# Patient Record
Sex: MALE | Race: AMERICAN INDIAN OR ALASKA NATIVE | HISPANIC OR LATINO
[De-identification: names, ages, dates, MRNs, and addresses within clinical notes are randomized per-mention and may not be internally consistent; named-entity substitution may affect disease eponyms.]

---

## 2020-10-21 ENCOUNTER — APPOINTMENT (OUTPATIENT)
Dept: NEUROLOGY | Facility: CLINIC | Age: 25
End: 2020-10-21
Payer: MEDICAID

## 2020-10-21 VITALS
DIASTOLIC BLOOD PRESSURE: 67 MMHG | WEIGHT: 123 LBS | OXYGEN SATURATION: 98 % | BODY MASS INDEX: 19.3 KG/M2 | SYSTOLIC BLOOD PRESSURE: 102 MMHG | TEMPERATURE: 98.7 F | HEIGHT: 67 IN | HEART RATE: 66 BPM

## 2020-10-21 DIAGNOSIS — Z86.59 PERSONAL HISTORY OF OTHER MENTAL AND BEHAVIORAL DISORDERS: ICD-10-CM

## 2020-10-21 PROBLEM — Z00.00 ENCOUNTER FOR PREVENTIVE HEALTH EXAMINATION: Status: ACTIVE | Noted: 2020-10-21

## 2020-10-21 PROCEDURE — 99072 ADDL SUPL MATRL&STAF TM PHE: CPT

## 2020-10-21 PROCEDURE — 99214 OFFICE O/P EST MOD 30 MIN: CPT

## 2020-10-21 NOTE — DISCUSSION/SUMMARY
[FreeTextEntry1] : Impression: \par 24 year old male presents today for evaluation of feeling slow and time management issues. He states this all happened about seven years ago.\par \par History of schizophrenia.\par \par MOCA 17/30\par \par Plan: \par 1) Baseline labs, heavy metal screen, and autoimmune panel\par 2) Needs neuro psych testing \par 3) Needs rEEG \par 4) Parents to get MRI scan and medical records from [facility]\par 5) Follow up after labs, rEEG, and neuro psych testing \par

## 2020-10-21 NOTE — HISTORY OF PRESENT ILLNESS
[FreeTextEntry1] : Viktor is a 24 year, , old male who presents today for evaluation of cognitive function. Patient accompanied by his parents. \par \par Patient is English speaking. Parents speak mainly Azeri,  is used.\par \par Viktor reports that this all started 6 years ago and he just feels different. He has a hard time managing his tasks and cannot complete more than one thing at a time.\par \par The patient's mom says he is here today because up to 7 years ago he was a normal child. 6 years ago he had jaw surgery and soon after he became different and "slow". He slowly worsened over the next few years. He cannot manage his time. He finished high school and went to community college for 2 semesters. He was not doing his homework and had trouble concentrating. His classes would start at 9 AM and he would not leave to get to school until 10 am and then would leave late. He takes extremely long times to eat his meals and to take a bath. \par \par He started seeing a psychiatrist a year ago and was diagnosed with schizophrenia, the type was unknown. He was placed on Latuda and slowly increased to 20 mg and she noticed he became more irritable and there was no changes. They stopped the Latuda in May 2020. The irritableness improved off the medication.\par \par They had an MRI about 1.5 years ago and it showed nothing reports the parents. No other work up was done. The MRI was done in [facility].

## 2020-10-21 NOTE — PHYSICAL EXAM
[FreeTextEntry1] : General:\par Constitutional:  Sitting comfortably in NAD.\par Psychiatric: well-groomed, appropriate affect, insight/judgment intact\par Ears, Nose, Throat: no abnormalities, mucus membranes moist\par Neck: supple\par Cardiovascular: regular rate and rhythm, normal S1/S2, no murmurs \par Chest: Clear to bases. 	Abdomen: soft, non-tender\par Extremities: no edema, clubbing or cyanosis\par Skin:  no rash or neurocutaneous signs \par \par Cognitive: MOCA 17/30\par UNABLE TO FOLLOW MULTIPLE COMMANDS\par Orientation, language, memory and knowledge screens intact.\par Cranial Nerves:\par II: Full to confrontation; disc margins sharp. III/IV/VI: PERRL EOMF No nystagmus\par V1V2V3: Symmetric, VII: Face appears symmetric VIII: Normal to screening, IX/X: Palate Elevates Symmetrical  XI: Trapezius Symmetric  XII: Tongue midline\par Motor:\par Power: 5/5 throughout, tone: normal x 4 limbs, no tremor \par Sensation:\par Intact to light touch. \par Coordination/Gait:\par Finger-nose-finger intact, normal rapid-alternating movements.\par Fine motor normal with normal rapid finger taps\par Narrow based gait, tandem forward and back ok\par Reflexes:\par DTR: 2+ symmetric x 4 limbs\par Plantar response: down x 2

## 2020-10-22 LAB
ALBUMIN SERPL ELPH-MCNC: 4.9 G/DL
ALP BLD-CCNC: 108 U/L
ALT SERPL-CCNC: 18 U/L
ANION GAP SERPL CALC-SCNC: 11 MMOL/L
AST SERPL-CCNC: 17 U/L
BASOPHILS # BLD AUTO: 0.06 K/UL
BASOPHILS NFR BLD AUTO: 1 %
BILIRUB SERPL-MCNC: 0.6 MG/DL
BUN SERPL-MCNC: 15 MG/DL
CALCIUM SERPL-MCNC: 10.1 MG/DL
CHLORIDE SERPL-SCNC: 102 MMOL/L
CK SERPL-CCNC: 72 U/L
CO2 SERPL-SCNC: 28 MMOL/L
CREAT SERPL-MCNC: 0.68 MG/DL
CRP SERPL-MCNC: 0.12 MG/DL
EOSINOPHIL # BLD AUTO: 0.12 K/UL
EOSINOPHIL NFR BLD AUTO: 2 %
ERYTHROCYTE [SEDIMENTATION RATE] IN BLOOD BY WESTERGREN METHOD: 10 MM/HR
GLUCOSE SERPL-MCNC: 98 MG/DL
HCT VFR BLD CALC: 43.7 %
HGB BLD-MCNC: 14.3 G/DL
IMM GRANULOCYTES NFR BLD AUTO: 0.2 %
LYMPHOCYTES # BLD AUTO: 1.61 K/UL
LYMPHOCYTES NFR BLD AUTO: 27.3 %
MAN DIFF?: NORMAL
MCHC RBC-ENTMCNC: 30.9 PG
MCHC RBC-ENTMCNC: 32.7 GM/DL
MCV RBC AUTO: 94.4 FL
MONOCYTES # BLD AUTO: 0.44 K/UL
MONOCYTES NFR BLD AUTO: 7.5 %
NEUTROPHILS # BLD AUTO: 3.65 K/UL
NEUTROPHILS NFR BLD AUTO: 62 %
PLATELET # BLD AUTO: 184 K/UL
POTASSIUM SERPL-SCNC: 4 MMOL/L
PROT SERPL-MCNC: 7.4 G/DL
RBC # BLD: 4.63 M/UL
RBC # FLD: 13 %
RHEUMATOID FACT SER QL: <10 IU/ML
SODIUM SERPL-SCNC: 142 MMOL/L
TSH SERPL-ACNC: 2.38 UIU/ML
WBC # FLD AUTO: 5.89 K/UL

## 2020-10-23 LAB
ACE BLD-CCNC: 33 U/L
ALBUMIN MFR SERPL ELPH: 58.8 %
ALBUMIN SERPL-MCNC: 4.4 G/DL
ALBUMIN/GLOB SERPL: 1.5 RATIO
ALPHA1 GLOB MFR SERPL ELPH: 3.6 %
ALPHA1 GLOB SERPL ELPH-MCNC: 0.3 G/DL
ALPHA2 GLOB MFR SERPL ELPH: 7.5 %
ALPHA2 GLOB SERPL ELPH-MCNC: 0.6 G/DL
B-GLOBULIN MFR SERPL ELPH: 10.6 %
B-GLOBULIN SERPL ELPH-MCNC: 0.8 G/DL
C3 SERPL-MCNC: 88 MG/DL
C4 SERPL-MCNC: 12 MG/DL
ENA SCL70 IGG SER IA-ACNC: <0.2 AL
ENA SS-A AB SER IA-ACNC: <0.2 AL
ENA SS-B AB SER IA-ACNC: <0.2 AL
GAMMA GLOB FLD ELPH-MCNC: 1.4 G/DL
GAMMA GLOB MFR SERPL ELPH: 19.5 %
INTERPRETATION SERPL IEP-IMP: NORMAL
M PROTEIN SPEC IFE-MCNC: NORMAL
PROT SERPL-MCNC: 7.4 G/DL
PROT SERPL-MCNC: 7.4 G/DL
THYROGLOB AB SERPL-ACNC: <20 IU/ML
THYROPEROXIDASE AB SERPL IA-ACNC: 21.4 IU/ML

## 2020-12-04 ENCOUNTER — APPOINTMENT (OUTPATIENT)
Dept: NEUROLOGY | Facility: CLINIC | Age: 25
End: 2020-12-04
Payer: MEDICAID

## 2020-12-04 PROCEDURE — 95816 EEG AWAKE AND DROWSY: CPT

## 2020-12-04 PROCEDURE — 99072 ADDL SUPL MATRL&STAF TM PHE: CPT

## 2020-12-08 LAB
ANA PAT FLD IF-IMP: ABNORMAL
ANA SER IF-ACNC: ABNORMAL

## 2021-02-11 ENCOUNTER — APPOINTMENT (OUTPATIENT)
Dept: NEUROLOGY | Facility: CLINIC | Age: 26
End: 2021-02-11
Payer: MEDICAID

## 2021-02-11 VITALS
DIASTOLIC BLOOD PRESSURE: 73 MMHG | OXYGEN SATURATION: 96 % | HEIGHT: 67 IN | BODY MASS INDEX: 19.62 KG/M2 | SYSTOLIC BLOOD PRESSURE: 115 MMHG | WEIGHT: 125 LBS | TEMPERATURE: 98.6 F | HEART RATE: 84 BPM

## 2021-02-11 PROCEDURE — 99214 OFFICE O/P EST MOD 30 MIN: CPT

## 2021-02-11 PROCEDURE — 99072 ADDL SUPL MATRL&STAF TM PHE: CPT

## 2021-02-11 NOTE — HISTORY OF PRESENT ILLNESS
[FreeTextEntry1] : \amber Lr returns for follow-up today, with his father.\par \par There are concerns about his cognition.  Hx of schizophrenia.\par \par He reports doing a bit better than when first seen in October.\par \par His father reports that he is asking for permission for everything, such as cutting his own fingernails.\par Father reports not a lot of motivation, and his mother is becoming upset about it.\par He was better before the pandemic.\amber Marquez reports no concerns about covid specifically.\par Still may take 1 hour to eat, used to be 2 hours.  Showers take 30min.\par javier Has had a tantrum last week, kicking and punching the walls, after his mother mentioned he was \par Felt there was too much noise and people were talking about him.\par javier Does not recall taking benzodiazepines.\par \par

## 2021-02-11 NOTE — DISCUSSION/SUMMARY
[FreeTextEntry1] : Impression:\par 1) prior diagnosis of schizophrenia, but not seeing therapist or psychiatrist.  Some question \par \par Plan:\par 1) trial of clonazepam 0.5mg PRN agitation/outbrust\par \par

## 2021-02-11 NOTE — PHYSICAL EXAM
[FreeTextEntry1] : General:\par Constitutional:  Sitting comfortably in NAD.\par Psychiatric: well-groomed, appropriate affect - mild psychomotor slowing.\par Ears, Nose, Throat: no abnormalities, mucus membranes moist\par Neck: supple\par Extremities: no edema, clubbing or cyanosis\par Skin: no rash or neuro-cutaneous signs \par \par Cognitive:\par Orientation, language, memory and knowledge screens intact.\par \par Cranial Nerves:\par II: CAMERON. III/IV/VI: EOM Full.  VII: Face appears symmetric VIII: Normal to screening\par IX/X: normal phonation  XI: Trapezius Symmetric  XII: Tongue midline\par Motor:\par Power: no pronator drift\par \par Narrow based gait\par \par \par \par

## 2021-04-20 ENCOUNTER — APPOINTMENT (OUTPATIENT)
Dept: NEUROLOGY | Facility: CLINIC | Age: 26
End: 2021-04-20
Payer: MEDICAID

## 2021-04-20 PROCEDURE — 99072 ADDL SUPL MATRL&STAF TM PHE: CPT

## 2021-04-20 PROCEDURE — 96138 PSYCL/NRPSYC TECH 1ST: CPT

## 2021-04-20 PROCEDURE — 96133 NRPSYC TST EVAL PHYS/QHP EA: CPT

## 2021-04-20 PROCEDURE — 96139 PSYCL/NRPSYC TST TECH EA: CPT

## 2021-04-20 PROCEDURE — 96116 NUBHVL XM PHYS/QHP 1ST HR: CPT

## 2021-04-20 PROCEDURE — 96132 NRPSYC TST EVAL PHYS/QHP 1ST: CPT

## 2021-04-28 ENCOUNTER — APPOINTMENT (OUTPATIENT)
Dept: NEUROLOGY | Facility: CLINIC | Age: 26
End: 2021-04-28
Payer: MEDICAID

## 2021-04-28 PROCEDURE — 96133 NRPSYC TST EVAL PHYS/QHP EA: CPT | Mod: 95

## 2021-07-09 ENCOUNTER — LABORATORY RESULT (OUTPATIENT)
Age: 26
End: 2021-07-09

## 2021-07-09 ENCOUNTER — APPOINTMENT (OUTPATIENT)
Dept: NEUROLOGY | Facility: CLINIC | Age: 26
End: 2021-07-09
Payer: MEDICAID

## 2021-07-09 VITALS
OXYGEN SATURATION: 95 % | BODY MASS INDEX: 18.68 KG/M2 | TEMPERATURE: 98.6 F | HEIGHT: 67 IN | SYSTOLIC BLOOD PRESSURE: 115 MMHG | RESPIRATION RATE: 16 BRPM | DIASTOLIC BLOOD PRESSURE: 63 MMHG | HEART RATE: 86 BPM | WEIGHT: 119 LBS

## 2021-07-09 DIAGNOSIS — R45.1 RESTLESSNESS AND AGITATION: ICD-10-CM

## 2021-07-09 PROCEDURE — 99214 OFFICE O/P EST MOD 30 MIN: CPT

## 2021-07-09 NOTE — PHYSICAL EXAM
[FreeTextEntry1] : General:\par Constitutional:  Sitting comfortably in NAD.\par Psychiatric: well-groomed, appropriate affect\par Ears, Nose, Throat: no abnormalities, mucus membranes moist\par Neck: supple\par Extremities: no edema, clubbing or cyanosis\par Skin: no rash or neuro-cutaneous signs \par \par Denies voices\par \par Cognitive:\par Slowed but responsive and engaging.  Difficulty completing sentences.\par President: Cal Muñoz,  is Desi Matute\par Prior: Mal Mark\par Date: took 1 minute, but then was able to get the date correctly.\par \par Cranial Nerves:\par II: CAMERON. III/IV/VI: EOM Full.  VII: Face appears symmetric VIII: Normal to screening\par IX/X: normal phonation  XI: Trapezius Symmetric  XII: Tongue midline\par Motor:\par Power: no pronator drift\par \par Narrow based gait\par \par \par \par

## 2021-07-09 NOTE — DISCUSSION/SUMMARY
[FreeTextEntry1] : Impression:\par 1) prior diagnosis of schizophrenia, but not seeing therapist or psychiatrist any longer.  Some question of whether this secondary to a neurological primary cause, such as an autoimmune process.\par \par Plan:\par 1) re-send trial of clonazepam 0.5mg PRN agitation/outbrust\par 2) INR/PTT and brief immunology screen\par 3) PET scan\par 4) vEEG with LP

## 2021-07-09 NOTE — HISTORY OF PRESENT ILLNESS
[FreeTextEntry1] : \amber Lr returns for follow-up today, with his father.\par \par There have been no changes in his behavior since last visit.\par \par Neuropscyhology testing showed profound dysfunction, and although they cannot determine whether this is primary and a secondary cause to psychosis, from the psychology perspective his pattern and temporal course is consistent with schizophrenia\par \par His father reports he is still very slow, cannot complete tasks, that he is asking for permission for everything, such as cutting his own fingernails.\par Father reports not a lot of motivation, and his mother is becoming upset about it.\par \amber Father reports that he does not like other people, does not like noises, and sounds to have delusions about other people talking negatively about him, occasionally positive things.\par \par He was better before the pandemic.\amber Marquez reports no concerns about covid specifically.\amebr Still may take 1 hour to eat, used to be 2 hours.  Showers take 30min.\amber herrera Has had a tantrum last week, kicking and punching the walls, after his mother mentioned he was \amber Felt there was too much noise and people were talking about him.\par javier Does not recall taking benzodiazepines.\par \par

## 2021-07-13 LAB
A-TUMOR NECROSIS FACT SERPL-MCNC: 1.7 PG/ML
B BURGDOR IGG+IGM SER QL IB: NORMAL
C3 SERPL-MCNC: 97 MG/DL
C4 SERPL-MCNC: 13 MG/DL
CH50 SERPL-MCNC: 42 U/ML
CK SERPL-CCNC: 83 U/L
CRP SERPL-MCNC: <3 MG/L
ERYTHROCYTE [SEDIMENTATION RATE] IN BLOOD BY WESTERGREN METHOD: 14 MM/HR
ESTIMATED AVERAGE GLUCOSE: 97 MG/DL
FOLATE SERPL-MCNC: 10.2 NG/ML
HBA1C MFR BLD HPLC: 5 %
IGNF SERPL-MCNC: <4.2 PG/ML
IL10 SERPL-MCNC: <2.8 PG/ML
IL12 SERPL-MCNC: <1.9 PG/ML
IL13 SERPL-MCNC: <1.7 PG/ML
IL17A SERPL-MCNC: <1.4 PG/ML
IL2 SERPL-MCNC: 325.9 PG/ML
IL2 SERPL-MCNC: <2.1 PG/ML
IL4 SERPL-MCNC: <2.2 PG/ML
IL6 SERPL-MCNC: <2 PG/ML
IL8 SERPL-MCNC: <3 PG/ML
INR PPP: 1.02 RATIO
INTERLEUKIN 1 BETA: <6.5 PG/ML
INTERLEUKIN 5: <2.1 PG/ML
PT BLD: 12 SEC
THYROGLOB AB SERPL-ACNC: <20 IU/ML
THYROPEROXIDASE AB SERPL IA-ACNC: 25.6 IU/ML
TSH SERPL-ACNC: 2.29 UIU/ML
VIT B12 SERPL-MCNC: 592 PG/ML

## 2021-07-16 LAB
GAD65 AB SER-MCNC: 0 NMOL/L
PARANEOPLASTIC AB PNL SER: NORMAL

## 2021-07-17 ENCOUNTER — APPOINTMENT (OUTPATIENT)
Dept: MRI IMAGING | Facility: CLINIC | Age: 26
End: 2021-07-17
Payer: MEDICAID

## 2021-07-17 ENCOUNTER — RESULT REVIEW (OUTPATIENT)
Age: 26
End: 2021-07-17

## 2021-07-17 ENCOUNTER — OUTPATIENT (OUTPATIENT)
Dept: OUTPATIENT SERVICES | Facility: HOSPITAL | Age: 26
LOS: 1 days | End: 2021-07-17

## 2021-07-17 PROCEDURE — 70551 MRI BRAIN STEM W/O DYE: CPT | Mod: 26

## 2021-08-05 ENCOUNTER — OUTPATIENT (OUTPATIENT)
Dept: OUTPATIENT SERVICES | Facility: HOSPITAL | Age: 26
LOS: 1 days | End: 2021-08-05
Payer: MEDICAID

## 2021-08-05 LAB — GLUCOSE BLDC GLUCOMTR-MCNC: 94 MG/DL — SIGNIFICANT CHANGE UP (ref 70–99)

## 2021-08-05 PROCEDURE — 82962 GLUCOSE BLOOD TEST: CPT

## 2021-08-05 PROCEDURE — 78608 BRAIN IMAGING (PET): CPT

## 2021-08-05 PROCEDURE — 78608 BRAIN IMAGING (PET): CPT | Mod: 26

## 2021-08-26 ENCOUNTER — APPOINTMENT (OUTPATIENT)
Dept: NEUROLOGY | Facility: CLINIC | Age: 26
End: 2021-08-26
Payer: MEDICAID

## 2021-08-26 VITALS
BODY MASS INDEX: 18.64 KG/M2 | HEIGHT: 66 IN | DIASTOLIC BLOOD PRESSURE: 65 MMHG | RESPIRATION RATE: 16 BRPM | WEIGHT: 116 LBS | SYSTOLIC BLOOD PRESSURE: 102 MMHG | HEART RATE: 73 BPM | TEMPERATURE: 98.7 F | OXYGEN SATURATION: 97 %

## 2021-08-26 DIAGNOSIS — R68.89 OTHER GENERAL SYMPTOMS AND SIGNS: ICD-10-CM

## 2021-08-26 PROCEDURE — 99213 OFFICE O/P EST LOW 20 MIN: CPT

## 2021-09-19 ENCOUNTER — RX RENEWAL (OUTPATIENT)
Age: 26
End: 2021-09-19

## 2021-10-06 PROBLEM — R68.89 SPELLS OF DECREASED ATTENTIVENESS: Status: ACTIVE | Noted: 2021-07-09

## 2021-10-06 NOTE — HISTORY OF PRESENT ILLNESS
[FreeTextEntry1] : \amber Lr returns for follow-up today, with his father.\par \par There have been no big changes in his behavior since last visit.\par \par FDG performed - seen to be normal\par \par He is still having difficulty with feeling that neighbors and strangers are talking about him.\par The family believes \par \par \par Neuropsychology testing showed profound dysfunction, and although they cannot determine whether this is primary and a secondary cause to psychosis, from the psychology perspective his pattern and temporal course is consistent with schizophrenia\par \par His father reports he is still very slow, cannot complete tasks, that he is asking for permission for everything, such as cutting his own fingernails.\par \par Father reports not a lot of motivation, and his mother is becoming upset about it.\par \par He was better before the pandemic.\amber Marquez reports no concerns about covid specifically.\par Still may take 1 hour to eat, used to be 2 hours.  Showers take 30min.\par javier Has had a tantrum last week, kicking and punching the walls, after his mother mentioned he was \par Felt there was too much noise and people were talking about him.\par \amber Does not recall taking benzodiazepines.\par \par

## 2021-10-06 NOTE — DISCUSSION/SUMMARY
[FreeTextEntry1] : Impression:\par 1) significant decline, cognitive\par 2) used anti-psychotic, Latuda, for 2-3 months without improvement and perhaps worse.\par \par \par Plan:\par 1) will offer to the family a final step in the evaluation, admit for vEEG, LP, and trial of steroids and psychiatry consultation.  They will consider and let us know.\par 2) trial of effexor

## 2022-03-21 NOTE — PHYSICAL EXAM
[FreeTextEntry1] : General:\par Constitutional:  Sitting comfortably in NAD.\par Psychiatric: well-groomed, appropriate affect, eye contact\par Ears, Nose, Throat: no abnormalities, mucus membranes moist\par Neck: supple\par Extremities: no edema, clubbing or cyanosis\par Skin: no rash or neuro-cutaneous signs \par \par Denies voices\par \par Cognitive:\par Slowed but responsive and engaging.  Difficulty completing sentences.\par President: Cal Muñoz,  is Desi Matute\par \par Cranial Nerves:\par II: CAMERON. III/IV/VI: EOM Full.  VII: Face appears symmetric VIII: Normal to screening\par IX/X: normal phonation  XI: Trapezius Symmetric  XII: Tongue midline\par Motor:\par Power: no pronator drift\par \par Narrow based gait\par \par \par \par 
21-Mar-2022 08:47

## 2022-07-26 LAB
A-TUMOR NECROSIS FACT SERPL-MCNC: <1.7 PG/ML
ARSENIC, BLOOD: 7 UG/L
CADMIUM SERPL-MCNC: <0.5 UG/L
GAD65 AB SER-MCNC: 0 NMOL/L
IGNF SERPL-MCNC: <4.2 PG/ML
IL10 SERPL-MCNC: <2.8 PG/ML
IL12 SERPL-MCNC: <1.9 PG/ML
IL13 SERPL-MCNC: <1.7 PG/ML
IL17A SERPL-MCNC: <1.4 PG/ML
IL2 SERPL-MCNC: 481.7 PG/ML
IL2 SERPL-MCNC: <2.1 PG/ML
IL4 SERPL-MCNC: <2.2 PG/ML
IL6 SERPL-MCNC: <2 PG/ML
IL8 SERPL-MCNC: <3 PG/ML
INTERLEUKIN 1 BETA: <6.5 PG/ML
INTERLEUKIN 5: <2.1 PG/ML
LEAD BLD-MCNC: <1 UG/DL
MERCURY BLD-MCNC: 2.5 UG/L
NMDA RECEPTOR AB N-METHYL-D-ASPARTATE RECEPTOR AB IGG, SERUM WITH REFLEX TO TITER: NORMAL
VGKC AB SER-SCNC: 14 PMOL/L

## 2022-12-08 ENCOUNTER — APPOINTMENT (OUTPATIENT)
Dept: NEUROLOGY | Facility: CLINIC | Age: 27
End: 2022-12-08

## 2022-12-08 ENCOUNTER — NON-APPOINTMENT (OUTPATIENT)
Age: 27
End: 2022-12-08

## 2022-12-08 VITALS
BODY MASS INDEX: 20.57 KG/M2 | OXYGEN SATURATION: 95 % | TEMPERATURE: 96.3 F | HEIGHT: 66 IN | WEIGHT: 128 LBS | DIASTOLIC BLOOD PRESSURE: 64 MMHG | SYSTOLIC BLOOD PRESSURE: 99 MMHG | HEART RATE: 68 BPM

## 2022-12-08 DIAGNOSIS — R41.89 OTHER SYMPTOMS AND SIGNS INVOLVING COGNITIVE FUNCTIONS AND AWARENESS: ICD-10-CM

## 2022-12-08 DIAGNOSIS — R41.844 FRONTAL LOBE AND EXECUTIVE FUNCTION DEFICIT: ICD-10-CM

## 2022-12-08 PROCEDURE — 99214 OFFICE O/P EST MOD 30 MIN: CPT

## 2022-12-08 RX ORDER — ERGOCALCIFEROL 1.25 MG/1
1.25 MG CAPSULE, LIQUID FILLED ORAL
Qty: 4 | Refills: 0 | Status: ACTIVE | COMMUNITY
Start: 2022-07-09

## 2022-12-08 RX ORDER — OLANZAPINE 20 MG/1
20 TABLET, FILM COATED ORAL
Qty: 30 | Refills: 0 | Status: ACTIVE | COMMUNITY
Start: 2022-12-05

## 2022-12-22 PROBLEM — R41.844 EXECUTIVE FUNCTION DEFICIT: Status: ACTIVE | Noted: 2020-10-21

## 2022-12-22 PROBLEM — R41.89 COGNITIVE DECLINE: Status: ACTIVE | Noted: 2020-10-21

## 2022-12-22 NOTE — PHYSICAL EXAM
[FreeTextEntry1] : General:\par Constitutional:  Sitting comfortably in NAD.\par Psychiatric: well-groomed, appropriate affect, eye contact\par Ears, Nose, Throat: no abnormalities, mucus membranes moist\par Neck: supple\par Extremities: no edema, clubbing or cyanosis\par Skin: no rash or neuro-cutaneous signs \par \par Denies voices\par \par Cognitive:\par Slowed but responsive and engaging.  Difficulty completing sentences, slowed but verbal output reasonable.\par \par Cranial Nerves:\par II: CAMERON. III/IV/VI: EOM Full.  VII: Face appears symmetric VIII: Normal to screening\par IX/X: normal phonation  XI: Trapezius Symmetric  XII: Tongue midline\par Motor:\par Power: no pronator drift\par \par Narrow based gait\par \par \par \par

## 2022-12-22 NOTE — HISTORY OF PRESENT ILLNESS
[FreeTextEntry1] : \amber Lr returns for follow-up today, with his father and mother.\par \par He was admitted to [facility] due to weight loss and feeling that neighbors and strangers are talking about him.\amber Admitted to psychiatry there for 1 month, dc 10/11/22.  Currently on olanzepine 20mg/d.\par Planned for a center with socialization.\par \par Gaining some weight.  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  Considering radiation due to size and location.\par \par MRI 2021 showed cortical volume loss and a probable arachnoid cyst.\par FDG brain normal\par Neuropsychology testing showed profound dysfunction, and although they cannot determine whether this is primary and a secondary cause to psychosis, from the psychology perspective his pattern and temporal course is consistent with schizophrenia\par \par His father reports he is still very slow, cannot complete tasks, that he is asking for permission for everything, such as cutting his own fingernails.\par \par Father reports not a lot of motivation, and his mother is becoming upset about it.\par \par He was better before the pandemic.\amber Marquez reports no concerns about covid specifically.\amber Still may take 1 hour to eat, used to be 2 hours.  Showers take 30min.\par javier Has had a tantrum last week, kicking and punching the walls, after his mother mentioned he was \par Felt there was too much noise and people were talking about him.\par \amber Does not recall taking benzodiazepines.\par \par

## 2022-12-22 NOTE — DISCUSSION/SUMMARY
[FreeTextEntry1] : Impression:\par 1) AVM, small.  Reviewed MRI brain - in addition to the known right arachnoid cyst and volume loss, the GRE showed changes on the left pre-central region.  Led to cerebral angiogram, with question of small AVM.  They are considering radiation therapy at [facility], they would like a second opinion.\par 2) psychosis - appears to be primary psychiatric, as no significant neuro/autoimmune signals.\par \par Plan:\par 1) family will obtain images of cerebral angiogram then can present at cerebrovascular conference for larger consensus opinions on treatment options.

## 2023-01-17 ENCOUNTER — NON-APPOINTMENT (OUTPATIENT)
Age: 28
End: 2023-01-17

## 2023-01-17 ENCOUNTER — APPOINTMENT (OUTPATIENT)
Dept: NEUROSURGERY | Facility: CLINIC | Age: 28
End: 2023-01-17
Payer: MEDICAID

## 2023-01-17 VITALS
BODY MASS INDEX: 22.27 KG/M2 | HEART RATE: 79 BPM | SYSTOLIC BLOOD PRESSURE: 94 MMHG | RESPIRATION RATE: 16 BRPM | TEMPERATURE: 98.2 F | WEIGHT: 138 LBS | DIASTOLIC BLOOD PRESSURE: 66 MMHG | OXYGEN SATURATION: 98 %

## 2023-01-17 DIAGNOSIS — Q28.2 ARTERIOVENOUS MALFORMATION OF CEREBRAL VESSELS: ICD-10-CM

## 2023-01-17 PROCEDURE — 99204 OFFICE O/P NEW MOD 45 MIN: CPT

## 2023-01-17 NOTE — ADDENDUM
[FreeTextEntry1] : 2023\par \par Josse Bacon MD\par [address]\par [address]\[place] [address]\par \par Patient’s Name:  Viktor Whittaker\par : 1995\par \par Dear Dr. Bacon:\par \par We met Viktor in our office at [facility].  As you know, he is a 27 years-old right-handed man with history of psychosis and cognitive decline who underwent neuro-imaging work-up to determine the cause of his symptoms.  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  \par \par We discussed what a brain AVM is and the risks of rupture.  Given his young age, I am recommending treatment for the AVM.  Given the location and small size of the AVM nidus, I am recommending radiosurgery.  They will continue care with Dr. Fernandez Juarez, who knows the patient and performed the angiogram.  He recommended the same treatment plan.\par \par Thank you for allowing us to participate Viktor’s care.  I will keep you always updated.\par \par Sincerely,\par \par \par Evan Romero MD\par Chief\par Neuro-Endovascular Surgery and \par Interventional Neuroradiology \par [facility]\par \par [facility]\par [address]\par [address] Floor\[place] [address]\par T:  [phone]\par F:  [phone]\par \par cc:	Viktor Whittaker\par [address]\[place] [address]\par \par \par \par 	\par \par \par  PROCEDURES:  Right shoulder arthroscopy 27-Jan-2022 14:29:10  Amarilis Rojas

## 2023-01-17 NOTE — PHYSICAL EXAM
[Motor Tone] : muscle tone was normal in all four extremities [Motor Strength] : muscle strength was normal in all four extremities [Neck Appearance] : the appearance of the neck was normal [] : no respiratory distress [Respiration, Rhythm And Depth] : normal respiratory rhythm and effort [Abnormal Walk] : normal gait [FreeTextEntry1] : flat affect, soft spoken

## 2023-01-17 NOTE — DATA REVIEWED
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

## 2023-01-17 NOTE — REASON FOR VISIT
[Second Opinion] : a second opinion [Referred By: _________] : Patient was referred by PATIENCE [Parent] : parent [FreeTextEntry1] : brain AVM

## 2023-01-17 NOTE — HISTORY OF PRESENT ILLNESS
[de-identified] : RIGHT-HANDED 28 y/o male with a hx of psychosis (admitted to [facility] 2022), cognitive and behavioral changes, schizophrenia, right arachnoid cyst,  presents to the office referred by Dr. Josse Bacon for a second opinion for the diagnosis of brain AVM.\par \par MRI brain done to rule out vasculitis - in addition to the known right arachnoid cyst and volume loss, the GRE showed changes on the left pre-central region. Led to cerebral angiogram, with question of small AVM. \par \par Cerebral angiogram from [facility] done by Dr. Fernandez Mccollum showed a left 2.4 x 3.5mm pre-central pial AVM, small, draining into the sagittal sinus. Considering radiation due to size and location.\par \par Neuropsychology testing showed profound dysfunction, and although they cannot determine whether this is primary and a secondary cause to psychosis, from the psychology perspective his pattern and temporal course is consistent with schizophrenia\par \par His father reports he is still very slow, cannot complete tasks, that he is asking for permission for everything, such as cutting his own fingernails.\par \par Does not recall taking benzodiazepines.\par \par Patient and parents deny headaches and seizures.\par \par \par \par Handedness: RIGHT\par \par Patient Address:\par [address] [place] [address] Av\[place] [address]\par \par Referring MD:\par DR. Josse Bacon\par [address]\[place] [address]\par

## 2023-01-17 NOTE — ASSESSMENT
[FreeTextEntry1] :  27 years-old right-handed man with history of psychosis and cognitive decline who underwent neuro-imaging work-up to determine the cause of his symptoms.  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  \par \par We discussed what a brain AVM is and the risks of rupture.  Given his young age, DR. Romero is recommending treatment for the AVM.  Given the location and small size of the AVM nidus, he is recommending radiosurgery.  They will continue care with Dr. Fernandez Juarez, who knows the patient and performed the angiogram.  He recommended the same treatment plan.\par \par